# Patient Record
Sex: MALE
[De-identification: names, ages, dates, MRNs, and addresses within clinical notes are randomized per-mention and may not be internally consistent; named-entity substitution may affect disease eponyms.]

---

## 2018-01-29 ENCOUNTER — RX ONLY (OUTPATIENT)
Age: 23
Setting detail: RX ONLY
End: 2018-01-29

## 2018-01-29 RX ORDER — SULFAMETHOXAZOLE AND TRIMETHOPRIM 800; 160 MG/1; MG/1
TABLET ORAL
Qty: 60 | Refills: 2 | Status: ERX

## 2018-12-08 ENCOUNTER — APPOINTMENT (EMERGENCY)
Dept: RADIOLOGY | Facility: HOSPITAL | Age: 23
End: 2018-12-08
Payer: COMMERCIAL

## 2018-12-08 ENCOUNTER — HOSPITAL ENCOUNTER (EMERGENCY)
Facility: HOSPITAL | Age: 23
Discharge: HOME | End: 2018-12-08
Attending: EMERGENCY MEDICINE | Admitting: EMERGENCY MEDICINE
Payer: COMMERCIAL

## 2018-12-08 VITALS
HEIGHT: 74 IN | WEIGHT: 180 LBS | RESPIRATION RATE: 18 BRPM | HEART RATE: 68 BPM | SYSTOLIC BLOOD PRESSURE: 115 MMHG | OXYGEN SATURATION: 100 % | TEMPERATURE: 97.5 F | DIASTOLIC BLOOD PRESSURE: 61 MMHG | BODY MASS INDEX: 23.1 KG/M2

## 2018-12-08 DIAGNOSIS — R51.9 HEADACHE: Primary | ICD-10-CM

## 2018-12-08 LAB
ANION GAP BLD CALC-SCNC: 18 MMOL/L (ref 4–13)
BUN BLD-MCNC: 11 MG/DL (ref 5–25)
CA-I BLD-SCNC: 1.13 MMOL/L (ref 1.12–1.32)
CHLORIDE BLD-SCNC: 102 MMOL/L (ref 100–108)
CREAT BLD-MCNC: 1.2 MG/DL (ref 0.6–1.3)
GFR SERPL CREATININE-BSD FRML MDRD: 86 ML/MIN/1.73SQ M
GLUCOSE SERPL-MCNC: 83 MG/DL (ref 65–140)
HCT VFR BLD CALC: 39 % (ref 36.5–49.3)
HGB BLDA-MCNC: 13.3 G/DL (ref 12–17)
PCO2 BLD: 25 MMOL/L (ref 21–32)
POTASSIUM BLD-SCNC: 3.9 MMOL/L (ref 3.5–5.3)
SODIUM BLD-SCNC: 141 MMOL/L (ref 136–145)
SPECIMEN SOURCE: ABNORMAL

## 2018-12-08 PROCEDURE — 99284 EMERGENCY DEPT VISIT MOD MDM: CPT

## 2018-12-08 PROCEDURE — 96360 HYDRATION IV INFUSION INIT: CPT

## 2018-12-08 PROCEDURE — 85014 HEMATOCRIT: CPT

## 2018-12-08 PROCEDURE — 70498 CT ANGIOGRAPHY NECK: CPT

## 2018-12-08 PROCEDURE — 70496 CT ANGIOGRAPHY HEAD: CPT

## 2018-12-08 PROCEDURE — 80047 BASIC METABLC PNL IONIZED CA: CPT

## 2018-12-08 RX ORDER — KETOROLAC TROMETHAMINE 30 MG/ML
15 INJECTION, SOLUTION INTRAMUSCULAR; INTRAVENOUS ONCE
Status: DISCONTINUED | OUTPATIENT
Start: 2018-12-08 | End: 2018-12-08 | Stop reason: HOSPADM

## 2018-12-08 RX ORDER — BUTALBITAL, ACETAMINOPHEN AND CAFFEINE 50; 325; 40 MG/1; MG/1; MG/1
1 TABLET ORAL ONCE
Status: COMPLETED | OUTPATIENT
Start: 2018-12-08 | End: 2018-12-08

## 2018-12-08 RX ORDER — ACETAMINOPHEN 325 MG/1
650 TABLET ORAL ONCE
Status: COMPLETED | OUTPATIENT
Start: 2018-12-08 | End: 2018-12-08

## 2018-12-08 RX ADMIN — SODIUM CHLORIDE 1000 ML: 0.9 INJECTION, SOLUTION INTRAVENOUS at 18:14

## 2018-12-08 RX ADMIN — ACETAMINOPHEN 650 MG: 325 TABLET ORAL at 18:14

## 2018-12-08 RX ADMIN — BUTALBITAL, ACETAMINOPHEN, AND CAFFEINE 1 TABLET: 50; 325; 40 TABLET ORAL at 18:14

## 2018-12-08 RX ADMIN — IOHEXOL 85 ML: 350 INJECTION, SOLUTION INTRAVENOUS at 19:06

## 2018-12-08 NOTE — ED ATTENDING ATTESTATION
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, football player  Drinking wed night, woke up Thursday with a HA  He was working out Thursday when his headache got worse  Headache is frontal, throbbing  Patient notes some photophobia  No sob or cough  No f/c/s  No neck stiffness  No focal neurological symptoms  No temporal artery pain/tenderness  No vision changes  y  Not worse in the AM  No head trauma  He went to urgent care who said he needs to be evaluated for subarachnoid  MDM well appearing 26year old male, I had a lengthy discussion with him regarding his workup  Suspect HA 2/2 working out and being hung over but given that it was sudden onset, cannot reliably rule out SAH  Discussed the risks and benefits of several options, just medications, CTA or CTA+LP  For now, patient wants to treat his HA and get the CTA, will discuss LP again after results come back  HA greatly improved after ct head, will dc with return precautions, patient does not want an LP  The patient (and any family present) verbalized understanding of the discharge instructions and warnings that would necessitate return to the Emergency Department  Gave verbal in addition to written discharge instructions     Specifically highlighted areas of special concern regarding the written and verbal discharge instructions and return precautions  All questions were answered prior to discharge

## 2018-12-08 NOTE — ED PROVIDER NOTES
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   While lifting weights, he began to experience worsening of his frontal headache  Initially, the headache was described as a frontal pressure, constant, nonradiating, without any associated symptoms  The headache then became a throbbing sensation which was constant, nonradiating, associated with photophobia  He continued to lift weights  He then went back to his residence and went to sleep  He woke up in the middle night with night sweats  He went back to sleep and then woke up later that morning  He continued complaining of the headache and took some Motrin which helped alleviate some of the pain  He then rested throughout the rest of the day  He woke up this morning with a mild pressure headache but vastly improved  He went to urgent care and was told to come to the emergency department due to possible subarachnoid hemorrhage  Currently, only complains of a mild headache  Denies any photophobia  Denies any history of migraines  Denies any family history of brain bleed or subarachnoid hemorrhage  He is up-to-date on his vaccinations  Denies fever/chills, nausea/vomiting, lightheadedness/dizziness, numbness/weakness, change in vision, URI symptoms, neck pain, chest pain, palpitations, shortness of breath, cough, back pain, flank pain, abdominal pain, diarrhea, hematochezia, melena, dysuria, hematuria  None       History reviewed  No pertinent past medical history  History reviewed   No pertinent surgical history  History reviewed  No pertinent family history  I have reviewed and agree with the history as documented  Social History   Substance Use Topics    Smoking status: Never Smoker    Smokeless tobacco: Never Used    Alcohol use Yes        Review of Systems   Constitutional: Negative for chills, fatigue and fever  HENT: Negative for rhinorrhea, sore throat and trouble swallowing  Eyes: Negative for photophobia and visual disturbance  Respiratory: Negative for cough, chest tightness and shortness of breath  Cardiovascular: Negative for chest pain, palpitations and leg swelling  Gastrointestinal: Negative for abdominal pain, blood in stool, diarrhea, nausea and vomiting  Endocrine: Negative for polyuria  Genitourinary: Negative for dysuria, flank pain and hematuria  Musculoskeletal: Negative for back pain and neck pain  Skin: Negative for color change and rash  Allergic/Immunologic: Negative for immunocompromised state  Neurological: Positive for headaches  Negative for dizziness, weakness, light-headedness and numbness  All other systems reviewed and are negative  Physical Exam  ED Triage Vitals   Temperature Pulse Respirations Blood Pressure SpO2   12/08/18 1647 12/08/18 1647 12/08/18 1647 12/08/18 1647 12/08/18 1647   97 5 °F (36 4 °C) 70 18 124/80 99 %      Temp Source Heart Rate Source Patient Position - Orthostatic VS BP Location FiO2 (%)   12/08/18 1647 12/08/18 1647 12/08/18 1647 12/08/18 1647 --   Oral Monitor Sitting Left arm       Pain Score       12/08/18 1715       5           Orthostatic Vital Signs  Vitals:    12/08/18 1647 12/08/18 1830   BP: 124/80 115/61   Pulse: 70 68   Patient Position - Orthostatic VS: Sitting Lying       Physical Exam   Constitutional: Vital signs are normal  He appears well-developed and well-nourished  He is cooperative  Non-toxic appearance  He does not have a sickly appearance  He does not appear ill   No distress  HENT:   Head: Normocephalic and atraumatic  Right Ear: Hearing, tympanic membrane, external ear and ear canal normal    Left Ear: Hearing, tympanic membrane, external ear and ear canal normal    Nose: Nose normal    Mouth/Throat: Uvula is midline, oropharynx is clear and moist and mucous membranes are normal  No tonsillar exudate  Eyes: Pupils are equal, round, and reactive to light  Conjunctivae, EOM and lids are normal    Fundoscopic exam:       The right eye shows no AV nicking and no papilledema  The left eye shows no AV nicking and no papilledema  Neck: Trachea normal, normal range of motion and full passive range of motion without pain  Neck supple  No Brudzinski's sign and no Kernig's sign noted  No thyroid mass present  Cardiovascular: Normal rate, regular rhythm, normal heart sounds and normal pulses  No murmur heard  Pulmonary/Chest: Effort normal and breath sounds normal    Abdominal: Soft  Normal appearance and bowel sounds are normal  There is no tenderness  There is no rigidity, no rebound, no guarding and no CVA tenderness  Neurological: He is alert  He has normal strength and normal reflexes  No cranial nerve deficit or sensory deficit  He displays a negative Romberg sign  Coordination and gait normal    Reflex Scores:       Bicep reflexes are 2+ on the right side and 2+ on the left side  Patellar reflexes are 2+ on the right side and 2+ on the left side  Cranial nerves 2-12 intact, strength 5/5 throughout, sensation intact throughout  Visual fields normal  Normal finger-to-nose and heel-to-shin  Normal rapid alternating movements  Negative Romberg  Normal gait  DTRs are normal and symmetric  Psychiatric: He has a normal mood and affect   His speech is normal and behavior is normal  Thought content normal        ED Medications  Medications   ketorolac (TORADOL) injection 15 mg (0 mg Intravenous Hold 12/8/18 1814)   acetaminophen (TYLENOL) tablet 650 mg (650 mg Oral Given 12/8/18 1814)   sodium chloride 0 9 % bolus 1,000 mL (1,000 mL Intravenous New Bag 12/8/18 1814)   butalbital-acetaminophen-caffeine (FIORICET,ESGIC) -40 mg per tablet 1 tablet (1 tablet Oral Given 12/8/18 1814)   iohexol (OMNIPAQUE) 350 MG/ML injection (MULTI-DOSE) 85 mL (85 mL Intravenous Given 12/8/18 1906)       Diagnostic Studies  Results Reviewed     Procedure Component Value Units Date/Time    POCT Chem 8+ [[id number]]  (Abnormal) Collected:  12/08/18 1849    Lab Status:  Final result Updated:  12/08/18 1854     SODIUM, I-STAT 141 mmol/l      Potassium, i-STAT 3 9 mmol/L      Chloride, istat 102 mmol/L      CO2, i-STAT 25 mmol/L      Anion Gap, i-STAT 18 (H) mmol/L      Calcium, Ionized i-STAT 1 13 mmol/L      BUN, I-STAT 11 mg/dl      Creatinine, i-STAT 1 2 mg/dl      eGFR 86 ml/min/1 73sq m      Glucose, i-STAT 83 mg/dl      Hct, i-STAT 39 %      Hgb, i-STAT 13 3 g/dl      Specimen Type VENOUS                 CTA head and neck with and without contrast   Final Result by Navneet Carranza MD (12/08 1943)      1  No acute intracranial CT abnormality  2   Patent major cervical and intracranial arteries without significant stenosis or dissection  No aneurysm  3   Incidentally noted 3 mm posterior left thyroid lobe nodule  According to guidelines published in the February 2015 white paper on incidental thyroid nodules in the Journal of the [employer] of Radiology [facility]), because the nodule is less than    1 cm and without suspicious features, no further evaluation is recommended  Workstation performed: [id number]               Procedures  Procedures      Phone Consults  ED Phone Contact    ED Course  ED Course as of Dec 08 1958   Sat Dec 08, 2018   1957 CTA head and neck is negative for subarachnoid  I notified patient that we cannot completely rule out a brain bleed  Risks and benefits discussed with patient regarding possible lumbar puncture  The patient states that his headache is 100% better after the medications  The patient would like to forego any lumbar puncture at this time because he is feeling much better  The patient states that he will be flying home from college in approximately 1 week  States that he could follow up with primary care doctor for further workup if necessary  Return precautions given  MDM  Number of Diagnoses or Management Options  Diagnosis management comments: At this time, I will treat the patient's headache symptomatically  Likely dehydration related to drinking alcohol which was exacerbated by lifting weights  CTA head and neck as patient is low risk for [facility]  Will discuss possible LP after medication and CTA  CritCare Time    Disposition  Final diagnoses:   Headache     Time reflects when diagnosis was documented in both MDM as applicable and the Disposition within this note     Time User Action Codes Description Comment    12/8/2018  7:57 PM Anthony Flannery Add [R51] Headache       ED Disposition     ED Disposition Condition Comment    Discharge  [facility] discharge to home/self care  Condition at discharge: Stable        Follow-up Information     Follow up With Specialties Details Why Contact Info Additional [address] Emergency Department Emergency Medicine Go to If symptoms worsen [address] [place] [address], [address] [place] [address], [facility]          Patient's Medications    No medications on file     No discharge procedures on file  ED Provider  Attending physically available and evaluated [facility]  I managed the patient along with the ED Attending      Electronically Signed by         Latasha Hunter MD  12/08/18 5617

## 2018-12-09 NOTE — DISCHARGE INSTRUCTIONS
Acute Headache   WHAT YOU NEED TO KNOW:   An acute headache is pain or discomfort that starts suddenly and gets worse quickly  You may have an acute headache only when you feel stress or eat certain foods  Other acute headache pain can happen every day, and sometimes several times a day  DISCHARGE INSTRUCTIONS:   Return to the emergency department if:   · You have severe pain  · You have numbness or weakness on one side of your face or body  · You have a headache that occurs after a blow to the head, a fall, or other trauma  · You have a headache, are forgetful or confused, or have trouble speaking  · You have a headache, stiff neck, and a fever  Contact your healthcare provider if:   · You have a constant headache and are vomiting  · You have a headache each day that does not get better, even after treatment  · You have changes in your headaches, or new symptoms that occur when you have a headache  · You have questions or concerns about your condition or care  Medicines: You may need any of the following:  · Prescription pain medicine  may be given  The medicine your healthcare provider recommends will depend on the kind of headaches you have  You will need to take prescription headache medicines as directed to prevent a problem called rebound headache  These headaches happen with regular use of pain relievers for headache disorders  · NSAIDs , such as ibuprofen, help decrease swelling, pain, and fever  This medicine is available with or without a doctor's order  NSAIDs can cause stomach bleeding or kidney problems in certain people  If you take blood thinner medicine, always ask your healthcare provider if NSAIDs are safe for you  Always read the medicine label and follow directions  · Acetaminophen  decreases pain and fever  It is available without a doctor's order  Ask how much to take and how often to take it  Follow directions   Read the labels of all other medicines you are using to see if they also contain acetaminophen, or ask your doctor or pharmacist  Acetaminophen can cause liver damage if not taken correctly  Do not use more than 3 grams (3,000 milligrams) total of acetaminophen in one day  · Antidepressants  may be given for some kinds of headaches  · Take your medicine as directed  Contact your healthcare provider if you think your medicine is not helping or if you have side effects  Tell him or her if you are allergic to any medicine  Keep a list of the medicines, vitamins, and herbs you take  Include the amounts, and when and why you take them  Bring the list or the pill bottles to follow-up visits  Carry your medicine list with you in case of an emergency  Manage your symptoms:   · Apply heat or ice  on the headache area  Use a heat or ice pack  For an ice pack, you can also put crushed ice in a plastic bag  Cover the pack or bag with a towel before you apply it to your skin  Ice and heat both help decrease pain, and heat also helps decrease muscle spasms  Apply heat for 20 to 30 minutes every 2 hours  Apply ice for 15 to 20 minutes every hour  Apply heat or ice for as long and for as many days as directed  You may alternate heat and ice  · Relax your muscles  Lie down in a comfortable position and close your eyes  Relax your muscles slowly  Start at your toes and work your way up your body  · Keep a record of your headaches  Write down when your headaches start and stop  Include your symptoms and what you were doing when the headache began  Record what you ate or drank for 24 hours before the headache started  Describe the pain and where it hurts  Keep track of what you did to treat your headache and if it worked  Prevent an acute headache:   · Avoid anything that triggers an acute headache  Examples include exposure to chemicals, going to high altitude, or not getting enough sleep  Create a regular sleep routine   Go to sleep at the same time and wake up at the same time each day  Do not use electronic devices before bedtime  These may trigger a headache or prevent you from sleeping well  · Do not smoke  Nicotine and other chemicals in cigarettes and cigars can trigger an acute headache or make it worse  Ask your healthcare provider for information if you currently smoke and need help to quit  E-cigarettes or smokeless tobacco still contain nicotine  Talk to your healthcare provider before you use these products  · Limit alcohol as directed  Alcohol can trigger an acute headache or make it worse  If you have cluster headaches, do not drink alcohol during an episode  For other types of headaches, ask your healthcare provider if it is safe for you to drink alcohol  Ask how much is safe for you to drink, and how often  · Exercise as directed  Exercise can reduce tension and help with headache pain  Aim for 30 minutes of physical activity on most days of the week  Your healthcare provider can help you create an exercise plan  · Eat a variety of healthy foods  Healthy foods include fruits, vegetables, low-fat dairy products, lean meats, fish, whole grains, and cooked beans  Your healthcare provider or dietitian can help you create meals plans if you need to avoid foods that trigger headaches  Follow up with your healthcare provider as directed:  Bring your headache record with you when you see your healthcare provider  Write down your questions so you remember to ask them during your visits  © 2017 [employer]  Information is for End User's use only and may not be sold, redistributed or otherwise used for commercial purposes  All illustrations and images included in CareNotes® are the copyrighted property of A D A M [employer] Inc  or Josue Guillory  The above information is an  only  It is not intended as medical advice for individual conditions or treatments   Talk to your doctor, nurse or pharmacist before following any medical regimen to see if it is safe and effective for you  Subarachnoid Hemorrhage   WHAT YOU NEED TO KNOW:   [facility]achnoid hemorrhage [facility]) is bleeding inside your brain from a ruptured (burst) blood vessel  Blood collects in the area underneath the membrane that surrounds your brain, called the subarachnoid space  SAH can occur when an abnormal blood vessel or an aneurysm bursts  An aneurysm is a bulging area of a blood vessel  A head injury can also cause a burst blood vessel  SAH is a type of stroke that is life-threatening with or without treatment  DISCHARGE INSTRUCTIONS:   Medicines:   · Antihypertensives: These help reduce high blood pressure  This may help prevent the return of SAH  It is also used to prevent stroke, bleeding inside your brain, or heart or kidney damage caused by [facility]  · Anticonvulsants: These help prevent seizures  · Anticoagulants: These help prevent blood clots from forming in your legs  · Take your medicine as directed  Contact your healthcare provider if you think your medicine is not helping or if you have side effects  Tell him or her if you are allergic to any medicine  Keep a list of the medicines, vitamins, and herbs you take  Include the amounts, and when and why you take them  Bring the list or the pill bottles to follow-up visits  Carry your medicine list with you in case of an emergency  Follow up with your healthcare provider, neurosurgeon, or endovascular specialist as directed: You may need more tests to show if you need further treatment or surgery  Write down your questions so you remember to ask them during your visits  What to expect after SAH:  You may not fully recover after SAH  You may not sleep well, think clearly, or be able to work as you did before  You may also be anxious, tired, or depressed  Your physical condition may be worse than before  You may be worried about having another SAH   This may affect your relationships  Talk to your healthcare provider if you have concerns about your recovery process  Ask when it is safe to drive a vehicle again  You may need someone to drive you until your brain has healed  Therapy: A physical therapist and an occupational therapist may exercise your arms, legs, and hands  They may also teach you new ways to do things around the house  A speech therapist may work with you to help you talk or swallow  Prevention:   · Control your blood pressure:  Ask your healthcare provider about how to control your blood pressure  · Do not smoke tobacco or drink excess alcohol: This will help reduce the risk of return SAH  · Do not use illegal drugs:  Illegal drugs may increase your risk of SAH because they make your blood vessels contract and your blood pressure go up  · Eat more vegetables:  Vegetables may help prevent strokes  Know the signs of a stroke:  Know the F A S T  test to recognize the signs of a stroke:  · F = Face:  Ask the person to smile  Drooping on one side of the mouth or face is a sign of a stroke  · A = Arms:  Ask the person to raise both arms  One arm that slowly comes back down or cannot be raised is a sign of a stroke  · S = Speech:  Ask the person to repeat a simple sentence that you say first  Speech that is slurred or sounds strange is a sign of a stroke  · T = Time:  Call 911 if you see any of these signs  This is an emergency  For support and more information:   · National Stroke Association  [address] , [address]  Phone: 1- 181 - [phone]  Web Address: [employer] [facility]  Contact your healthcare provider or neurologist if:   · You have neck stiffness that does not go away or a mild or moderate headache that lasts a few days  · You have questions or concerns about your condition or care    Seek care immediately or call 911 if:   · You have any of the following signs of a stroke:      ¨ Numbness or drooping on one side of your face     ¨ Weakness in an arm or leg    ¨ Confusion or difficulty speaking    ¨ Dizziness, a severe headache, or vision loss    · You have eye pain when you are in bright light, or you see double  · You have a seizure, or someone sees you lose consciousness  © 2017 [address] [employer] Information is for End User's use only and may not be sold, redistributed or otherwise used for commercial purposes  All illustrations and images included in CareNotes® are the copyrighted property of A D A M , Christine  or Josue Guillory  The above information is an  only  It is not intended as medical advice for individual conditions or treatments  Talk to your doctor, nurse or pharmacist before following any medical regimen to see if it is safe and effective for you

## 2019-12-27 ENCOUNTER — APPOINTMENT (RX ONLY)
Dept: URBAN - METROPOLITAN AREA CLINIC 95 | Facility: CLINIC | Age: 24
Setting detail: DERMATOLOGY
End: 2019-12-27

## 2019-12-27 DIAGNOSIS — L738 OTHER SPECIFIED DISEASES OF HAIR AND HAIR FOLLICLES: ICD-10-CM

## 2019-12-27 DIAGNOSIS — L73.9 FOLLICULAR DISORDER, UNSPECIFIED: ICD-10-CM

## 2019-12-27 DIAGNOSIS — L663 OTHER SPECIFIED DISEASES OF HAIR AND HAIR FOLLICLES: ICD-10-CM

## 2019-12-27 PROBLEM — L02.821 FURUNCLE OF HEAD [ANY PART, EXCEPT FACE]: Status: ACTIVE | Noted: 2019-12-27

## 2019-12-27 PROCEDURE — ? PRESCRIPTION

## 2019-12-27 PROCEDURE — 99203 OFFICE O/P NEW LOW 30 MIN: CPT

## 2019-12-27 PROCEDURE — ? TREATMENT REGIMEN

## 2019-12-27 PROCEDURE — ? COUNSELING

## 2019-12-27 RX ORDER — CLOBETASOL PROPIONATE 0.05 G/100ML
1 APPLICATION SHAMPOO TOPICAL QD
Qty: 1 | Refills: 1 | Status: ERX | COMMUNITY
Start: 2019-12-27

## 2019-12-27 RX ADMIN — CLOBETASOL PROPIONATE 1 APPLICATION: 0.05 SHAMPOO TOPICAL at 00:00

## 2019-12-27 ASSESSMENT — LOCATION ZONE DERM: LOCATION ZONE: SCALP

## 2019-12-27 ASSESSMENT — LOCATION DETAILED DESCRIPTION DERM: LOCATION DETAILED: LEFT MEDIAL FRONTAL SCALP

## 2019-12-27 ASSESSMENT — LOCATION SIMPLE DESCRIPTION DERM: LOCATION SIMPLE: LEFT SCALP

## 2019-12-27 NOTE — HPI: SKIN LESIONS
Is This A New Presentation, Or A Follow-Up?: Skin Lesions
How Severe Is Your Skin Lesion?: mild
Have Your Skin Lesions Been Treated?: not been treated
Additional History: Patient advised that he saw a dermatologist in [place] and was diagnosed with Folliculitis. He was given Ketoconazole shampoo and Clobetasol solution, but advised that these medications did not help.

## 2019-12-27 NOTE — PROCEDURE: TREATMENT REGIMEN
Detail Level: Zone
Continue Regimen: Outside prescription for Clobetasol Solution as directed for maintenance.
Initiate Treatment: clobetasol 0.05 % shampoo QD\\nDays Supply: 30\\nSig: Apply to dry scalp and let sit for 20 mins then rinse QD x 1 week, then 2-3 times weekly thereafter\\n\\nXimino 135 mg capsule, extended release QD\\nDays Supply: 30\\nSig: Take 1 PO QD
Plan: Discussed cause and effect at length with patient. Reviewed side effects and answered questions patient had during visit regarding diagnosis and treatment. Patient was advised to return PRN and can follow up with his dermatologist in [place] if he chooses.

## 2019-12-27 NOTE — PROCEDURE: MIPS QUALITY
Quality 402: Tobacco Use And Help With Quitting Among Adolescents: Patient screened for tobacco and never smoked
Quality 110: Preventive Care And Screening: Influenza Immunization: Influenza Immunization not Administered because Patient Refused.
Detail Level: Detailed
Quality 130: Documentation Of Current Medications In The Medical Record: Current Medications Documented
Quality 131: Pain Assessment And Follow-Up: Pain assessment using a standardized tool is documented as negative, no follow-up plan required

## 2020-03-23 ENCOUNTER — RX ONLY (OUTPATIENT)
Age: 25
Setting detail: RX ONLY
End: 2020-03-23